# Patient Record
Sex: FEMALE | Race: WHITE | URBAN - METROPOLITAN AREA
[De-identification: names, ages, dates, MRNs, and addresses within clinical notes are randomized per-mention and may not be internally consistent; named-entity substitution may affect disease eponyms.]

---

## 2017-09-28 ENCOUNTER — HOSPITAL ENCOUNTER (OUTPATIENT)
Dept: LAB | Age: 67
Discharge: HOME | End: 2017-09-28

## 2017-09-28 ENCOUNTER — OFFICE VISIT (OUTPATIENT)
Dept: DERMATOLOGY | Facility: AMBULATORY SURGERY CENTER | Age: 67
End: 2017-09-28

## 2017-09-28 VITALS
OXYGEN SATURATION: 94 % | TEMPERATURE: 98.3 F | BODY MASS INDEX: 32.78 KG/M2 | WEIGHT: 185 LBS | HEIGHT: 63 IN | DIASTOLIC BLOOD PRESSURE: 90 MMHG | SYSTOLIC BLOOD PRESSURE: 140 MMHG | HEART RATE: 63 BPM | RESPIRATION RATE: 18 BRPM

## 2017-09-28 DIAGNOSIS — L57.0 ACTINIC KERATOSIS: ICD-10-CM

## 2017-09-28 DIAGNOSIS — L82.1 SEBORRHEIC KERATOSES: ICD-10-CM

## 2017-09-28 DIAGNOSIS — D48.5 NEOPLASM OF UNCERTAIN BEHAVIOR OF SKIN OF NECK: Primary | ICD-10-CM

## 2017-09-28 DIAGNOSIS — L81.4 LENTIGINES: ICD-10-CM

## 2017-09-28 RX ORDER — PRAVASTATIN SODIUM 80 MG/1
TABLET ORAL
Refills: 3 | COMMUNITY
Start: 2017-07-19

## 2017-09-28 RX ORDER — LOSARTAN POTASSIUM 50 MG/1
TABLET ORAL
Refills: 10 | COMMUNITY
Start: 2017-09-17

## 2017-09-28 RX ORDER — SELENIUM SULFIDE 2.5 MG/100ML
LOTION TOPICAL
COMMUNITY

## 2017-09-28 RX ORDER — FAMOTIDINE 20 MG/1
TABLET, FILM COATED ORAL
Refills: 10 | COMMUNITY
Start: 2017-08-26

## 2017-09-28 RX ORDER — MELATONIN: COMMUNITY

## 2017-09-28 RX ORDER — HYDROCHLOROTHIAZIDE 50 MG/1
TABLET ORAL
COMMUNITY

## 2017-09-28 RX ORDER — PRAVASTATIN SODIUM 20 MG/1
TABLET ORAL
COMMUNITY
End: 2017-09-28 | Stop reason: SDUPTHER

## 2017-09-28 NOTE — PROGRESS NOTES
Chief Complaint   Patient presents with    Skin Exam     1. Have you been to the ER, urgent care clinic since your last visit? Hospitalized since your last visit? No    2. Have you seen or consulted any other health care providers outside of the [address] [place] [address] since your last visit? Include any pap smears or colon screening. Yes, [facility].      Pt has some new spots on her neck, upper back and bilateral shoulders that she is concerned about.

## 2017-09-28 NOTE — MR AVS SNAPSHOT
Visit Information Date & Time Provider Department Dept. Phone Encounter #  
 9/28/2017 11:30 AM ADELA Musedexterkaylen Daja57 [phone] [id number] Your Appointments 9/28/2017 11:30 AM  
Office Visit with ADELA Muse Daja57 [address] [facility]) Appt Note: est pt: 1 yr skin exam; est pt: 1 yr skin exam  
 [facility] Copier [address] [facility] [address]  
[address] [place] [address] [address] [facility] [address] Upcoming Health Maintenance Date Due Hepatitis C Screening 1950 DTaP/Tdap/Td series (1 - Tdap) 5/13/1971 BREAST CANCER SCRN MAMMOGRAM 5/13/2000 FOBT Q 1 YEAR AGE 50-75 5/13/2000 ZOSTER VACCINE AGE 60> 3/13/2010 GLAUCOMA SCREENING Q2Y 5/13/2015 OSTEOPOROSIS SCREENING (DEXA) 5/13/2015 Pneumococcal 65+ Low/Medium Risk (1 of 2 - PCV13) 5/13/2015 MEDICARE YEARLY EXAM 5/13/2015 INFLUENZA AGE 9 TO ADULT 8/1/2017 Allergies as of 9/28/2017  Review Complete On: 9/28/2017 By: Ross Ann Severity Noted Reaction Type Reactions Cephalexin  05/30/2012    Swelling Lisinopril  01/08/2015    Swelling Current Immunizations  Never Reviewed No immunizations on file. Not reviewed this visit Vitals BP Pulse Temp Resp Height(growth percentile) Weight(growth percentile) 140/90 (BP 1 Location: Left arm, BP Patient Position: Sitting) 63 98.3 °F (36.8 °C) (Oral) 18 5' 3\" (1.6 m) 185 lb (83.9 kg) SpO2 BMI OB Status Smoking Status 94% 32.77 kg/m2 Menopause Former Smoker Vitals History BMI and BSA Data Body Mass Index Body Surface Area 32.77 kg/m 2 1.93 m 2 Preferred Pharmacy Pharmacy Name Phone [facility] [address] [place] [address]. Zara Mcburney [phone] Your Updated Medication List  
  
   
 This list is accurate as of: 9/28/17 11:29 AM.  Always use your most recent med list.  
  
  
  
  
 ESTROVEN MAXIMUM STRENGTH 400 mcg Tab Generic drug:  BenjaminCa,Min-FA-Herbal No.157 Take  by mouth. famotidine 20 mg tablet Commonly known as:  PEPCID  
TAKE 1 TABLET TWICE A DAY  
  
 hydroCHLOROthiazide 50 mg tablet Commonly known as:  HYDRODIURIL Take 50 mg by mouth daily. losartan 50 mg tablet Commonly known as:  COZAAR  
TAKE 1 TABLET EVERY DAY NexIUM 20 mg capsule Generic drug:  esomeprazole Take  by mouth daily. * pravastatin 20 mg tablet Commonly known as:  PRAVACHOL Take 20 mg by mouth nightly. * pravastatin 80 mg tablet Commonly known as:  PRAVACHOL  
TAKE 1 TABLET BY MOUTH EVERY DAY  
  
 selenium sulfide 2.5 % lotion Apply  topically daily as needed. triamterene-hydroCHLOROthiazide 37.5-25 mg per capsule Commonly known as:  Navdeep Miguelito Take  by mouth daily. VITAMIN D3 1,000 unit tablet Generic drug:  cholecalciferol Take  by mouth daily. * Notice: This list has 2 medication(s) that are the same as other medications prescribed for you. Read the directions carefully, and ask your doctor or other care provider to review them with you. Patient Instructions Self Skin Exam and Sunscreens Early detection and treatment is essential in the treatment of all forms of skin cancer. If caught early, all forms of skin cancer are curable. In addition to your regular visits, you should perform a monthly skin examination. Over time, you become familiar with what is normally found on your skin and can identify new or suspicious spots. One of the screening tools you can use to assess your skin is to follow the ABCDEs: 
 
A= Asymmetry (One half is unlike the other half) B= Border (An irregular, scalloped or poorly defined edge) C= Color (Is varied from one area to another, has shades of tan, brown/ black,       white, red or blue) D= Diameter (Spots larger than 6mm or a pencil eraser) E= Evolving (New spots or one that is changing in size, shape, or color) A follow- up interval will be customized based on your history of skin cancer or level of skin damage and risk factors. In any case, if you notice a suspicious or new spot, an appointment should be arranged between regular visits. Everyone should use sunscreen and sun-safe practices, which is especially important for those with a personal or family history of skin cancer. Suggestions for this include: 1. Use daily moisturizers containing SPF 30 or higher. 2. Wear long sleeve clothing with UPF ratings and a broad-brimmed hat. 3. Apply sunscreen with SPF 30 or higher to all sun exposed areas if you are going to be in the sun. A broad spectrum UVA/ UVB sunscreen is best.  Dont forget to REAPPLY every two hours or more often if swimming or sweating! 4. Avoid outside activities during peak sun hours, especially in the summer (10am- 2pm). 5. DO NOT use tanning beds. Using sunscreen and sun-safe practices can help reduce the likelihood of developing skin cancer or additional skin cancers in those previously diagnosed. Introducing [facility]! Adrienne Sanchez introduces [facility] patient portal. Now you can access parts of your medical record, email your doctor's office, and request medication refills online. 1. In your internet browser, go to [email or web address] 2. Click on the First Time User? Click Here link in the Sign In box. You will see the New Member Sign Up page. 3. Enter your [facility] Access Code exactly as it appears below. You will not need to use this code after youve completed the sign-up process. If you do not sign up before the expiration date, you must request a new code. · [facility] Access Code: [email or web address] Expires: 12/27/2017 11:29 AM 
 
 4. Enter the last four digits of your Social Security Number (xxxx) and Date of Birth (mm/dd/yyyy) as indicated and click Submit. You will be taken to the next sign-up page. 5. Create a SCL ID. This will be your SCL login ID and cannot be changed, so think of one that is secure and easy to remember. 6. Create a SCL password. You can change your password at any time. 7. Enter your Password Reset Question and Answer. This can be used at a later time if you forget your password. 8. Enter your e-mail address. You will receive e-mail notification when new information is available in [address] 9. Click Sign Up. You can now view and download portions of your medical record. 10. Click the Download Summary menu link to download a portable copy of your medical information. If you have questions, please visit the Frequently Asked Questions section of the SCL website. Remember, SCL is NOT to be used for urgent needs. For medical emergencies, dial 911. Now available from your iPhone and Android! Please provide this summary of care documentation to your next provider. If you have any questions after today's visit, please call [phone].

## 2017-09-28 NOTE — PROGRESS NOTES
Name: Willy Mensah       Age: 79 y.o. Date: 9/28/2017    Chief Complaint:   Chief Complaint   Patient presents with    Skin Exam       Subjective:    HPI  Ms. Willy Mensah is a 79 y.o. female who presents for a skin exam.  The patient's last skin exam was on 9/26/16 and the patient does have current complaints related to her skin. She reports a growth on the right neck that is raised and bothersome. She also reports a lesion on the left side of the nose that is persistently scaly. The patient's pertinent skin history includes : AK, no history of skin cancer    ROS: Constitutional: Negative. Dermatological : positive for - skin lesion changes      Social History     Social History    Marital status:      Spouse name: N/A    Number of children: N/A    Years of education: N/A     Occupational History    Not on file. Social History Main Topics    Smoking status: Former Smoker     Types: Cigarettes     Quit date: 5/30/1983    Smokeless tobacco: Not on file    Alcohol use Yes    Drug use: Not on file    Sexual activity: Not on file     Other Topics Concern    Not on file     Social History Narrative       No family history on file. Past Medical History:   Diagnosis Date    Essential hypertension     History of actinic keratoses     Sun-damaged skin     Sunburn, blistering        No past surgical history on file. Current Outpatient Prescriptions   Medication Sig Dispense Refill    cholecalciferol (VITAMIN D3) 1,000 unit tablet Take  by mouth daily.  losartan (COZAAR) 50 mg tablet TAKE 1 TABLET EVERY DAY  10    Mv,Ca,Min-FA-Herbal No.157 (ESTROVEN MAXIMUM STRENGTH) 400 mcg tab Take  by mouth.  hydroCHLOROthiazide (HYDRODIURIL) 50 mg tablet Take 50 mg by mouth daily.  selenium sulfide 2.5 % lotion Apply  topically daily as needed.       famotidine (PEPCID) 20 mg tablet TAKE 1 TABLET TWICE A DAY  10    pravastatin (PRAVACHOL) 80 mg tablet TAKE 1 TABLET BY MOUTH EVERY DAY  3    esomeprazole (NEXIUM) 20 mg capsule Take  by mouth daily.  triamterene-hydrochlorothiazide (DYAZIDE) 37.5-25 mg per capsule Take  by mouth daily.  pravastatin (PRAVACHOL) 20 mg tablet Take 20 mg by mouth nightly. Allergies   Allergen Reactions    Cephalexin Swelling    Lisinopril Swelling         Objective:    Visit Vitals    /90 (BP 1 Location: Left arm, BP Patient Position: Sitting)    Pulse 63    Temp 98.3 °F (36.8 °C) (Oral)    Resp 18    Ht 5' 3\" (1.6 m)    Wt 83.9 kg (185 lb)    SpO2 94%    BMI 32.77 kg/m2       Doron Romero is a 79 y.o. female who appears well and in no distress. She is awake, alert, and oriented. There is no preauricular, submandibular, or cervical lymphadenopathy. A skin examination was performed including her scalp, face (including eyelids), ears, neck, chest, back, upper extremities. She has lentigines on sun exposed areas. There are scattered seborrheic keratoses - including the lesion of concern on the right neck with inflammation, 7 x 5 mm. There is a thin scaled AK on the left nasal sidewall, right nasal tip and nasal bridge. Assessment/Plan:  1. Solar lentigos. The diagnosis and relationship to sun exposure was reviewed. Sun protection advised. 2. Seborrheic keratoses. The diagnosis was reviewed and the patient was reassured that no treatment is needed for these benign lesions. 3. Actinic Keratoses. The diagnosis of this precancerous lesion related to sun exposure was reviewed. The patient will use 5-Fu for these. 4. Neoplasm of Uncertain Behavior, right neck. The differential diagnoses were discussed. A shave removal was advised to address this lesion. The procedure was reviewed and verbal and written consent were obtained. The risks of pain, bleeding, infection, recurrence and scar were discussed. I performed the anesthesia and Dr. Jelly Long performed the procedure.   The site was cleansed and anesthetized with 1% Lidocaine with Epinephrine 1:100,000. A shave removal was performed to remove the lesion in its clinical entirety. Drysol was used for hemostasis. The wound was bandaged and care reviewed. The specimen was sent to pathology. I will contact the patient with the results and any further treatment that may be necessary. [facility]   OFFICE PROCEDURE PROGRESS NOTE   Chart reviewed for the following:   Jeffrey YBARRA, have reviewed the History, Physical and updated the Allergic reactions for Lita B Luna. TIME OUT performed immediately prior to start of procedure:   Antonieta YBARRA, have performed the following reviews on Lita B Luna   prior to the start of the procedure:     * Patient was identified by name and date of birth   * Agreement on procedure being performed was verified   * Risks and Benefits explained to the patient   * Procedure site verified and marked as necessary   * Patient was positioned for comfort   * Consent was signed and verified     Time: 1200  Date of procedure: 9/28/2017  Procedure performed by: Kim Mustafa.  Lupe Syed  Provider assisted by: lpn   Patient assisted by: self   How tolerated by patient: tolerated the procedure well with no complications   Comments: none

## 2018-03-29 ENCOUNTER — OFFICE VISIT (OUTPATIENT)
Dept: DERMATOLOGY | Facility: AMBULATORY SURGERY CENTER | Age: 68
End: 2018-03-29

## 2018-03-29 ENCOUNTER — HOSPITAL ENCOUNTER (OUTPATIENT)
Dept: LAB | Age: 68
Discharge: HOME | End: 2018-03-29

## 2018-03-29 VITALS
WEIGHT: 185 LBS | OXYGEN SATURATION: 95 % | DIASTOLIC BLOOD PRESSURE: 82 MMHG | RESPIRATION RATE: 16 BRPM | BODY MASS INDEX: 32.78 KG/M2 | SYSTOLIC BLOOD PRESSURE: 148 MMHG | HEART RATE: 56 BPM | HEIGHT: 63 IN | TEMPERATURE: 98 F

## 2018-03-29 DIAGNOSIS — D18.01 CHERRY ANGIOMA: ICD-10-CM

## 2018-03-29 DIAGNOSIS — Z12.83 SCREENING FOR MALIGNANT NEOPLASM OF SKIN: ICD-10-CM

## 2018-03-29 DIAGNOSIS — D22.9 MULTIPLE BENIGN NEVI: ICD-10-CM

## 2018-03-29 DIAGNOSIS — L81.4 LENTIGINES: ICD-10-CM

## 2018-03-29 DIAGNOSIS — L57.0 ACTINIC KERATOSIS: ICD-10-CM

## 2018-03-29 DIAGNOSIS — D48.5 NEOPLASM OF UNCERTAIN BEHAVIOR OF SKIN OF BACK: Primary | ICD-10-CM

## 2018-03-29 DIAGNOSIS — L82.1 SEBORRHEIC KERATOSES: ICD-10-CM

## 2018-03-29 NOTE — MR AVS SNAPSHOT
[address] [facility] [address] [place] [address] 
[phone] Patient: Andrew To MRN: [id number] Visit Information Date & Time Provider Department Dept. Phone Encounter #  
 3/29/2018 11:00 AM ADELA Rossi57 [phone] Your Appointments 3/29/2018 11:00 AM  
Office Visit with ADELA Rossi57 [facility] Appt Note: 6 month skin check [facility] [address] [place] [address]  
[address] [facility] [address] [place] [id number] Upcoming Health Maintenance Date Due Hepatitis C Screening 1950 DTaP/Tdap/Td series (1 - Tdap) 5/13/1971 BREAST CANCER SCRN MAMMOGRAM 5/13/2000 FOBT Q 1 YEAR AGE 50-75 5/13/2000 ZOSTER VACCINE AGE 60> 3/13/2010 GLAUCOMA SCREENING Q2Y 5/13/2015 Bone Densitometry (Dexa) Screening 5/13/2015 Pneumococcal 65+ Low/Medium Risk (1 of 2 - PCV13) 5/13/2015 Influenza Age 5 to Adult 8/1/2017 MEDICARE YEARLY EXAM 3/14/2018 Allergies as of 3/29/2018  Review Complete On: 3/29/2018 By: Sendy Medeiros LPN Severity Noted Reaction Type Reactions Cephalexin  05/30/2012    Swelling Lisinopril  01/08/2015    Swelling Current Immunizations  Never Reviewed No immunizations on file. Not reviewed this visit Vitals BP Pulse Temp Resp Height(growth percentile) Weight(growth percentile) 148/82 (BP 1 Location: Left arm, BP Patient Position: Sitting) (!) 56 98 °F (36.7 °C) (Oral) 16 5' 3\" (1.6 m) 185 lb (83.9 kg) SpO2 BMI OB Status Smoking Status 95% 32.77 kg/m2 Menopause Former Smoker BMI and BSA Data Body Mass Index Body Surface Area 32.77 kg/m 2 1.93 m 2 Preferred Pharmacy Pharmacy Name Phone [facility] [address] [facility] [address] Baylee Li [phone] Your Updated Medication List  
  
   
This list is accurate as of 3/29/18 10:36 AM.  Always use your most recent med list.  
  
  
  
  
 ESTROVEN MAXIMUM STRENGTH 400 mcg Tab Generic drug:  Mv,Ca,Min-FA-Herbal No.157 Take  by mouth. famotidine 20 mg tablet Commonly known as:  PEPCID  
TAKE 1 TABLET TWICE A DAY  
  
 hydroCHLOROthiazide 50 mg tablet Commonly known as:  HYDRODIURIL Take 50 mg by mouth daily. losartan 50 mg tablet Commonly known as:  COZAAR  
TAKE 1 TABLET EVERY DAY NexIUM 20 mg capsule Generic drug:  esomeprazole Take  by mouth daily. * pravastatin 20 mg tablet Commonly known as:  PRAVACHOL Take 20 mg by mouth nightly. * pravastatin 80 mg tablet Commonly known as:  PRAVACHOL  
TAKE 1 TABLET BY MOUTH EVERY DAY  
  
 selenium sulfide 2.5 % lotion Apply  topically daily as needed. triamterene-hydroCHLOROthiazide 37.5-25 mg per capsule Commonly known as:  Lenton Bumps Take  by mouth daily. VITAMIN D3 1,000 unit tablet Generic drug:  cholecalciferol Take  by mouth daily. * Notice: This list has 2 medication(s) that are the same as other medications prescribed for you. Read the directions carefully, and ask your doctor or other care provider to review them with you. Patient Instructions Self Skin Exam and Sunscreens Early detection and treatment is essential in the treatment of all forms of skin cancer. If caught early, all forms of skin cancer are curable. In addition to your regular visits, you should perform a monthly skin examination. Over time, you become familiar with what is normally found on your skin and can identify new or suspicious spots. One of the screening tools you can use to assess your skin is to follow the ABCDEs: 
 
A= Asymmetry (One half is unlike the other half) B= Border (An irregular, scalloped or poorly defined edge) C= Color (Is varied from one area to another, has shades of tan, brown/ black,       white, red or blue) D= Diameter (Spots larger than 6mm or a pencil eraser) E= Evolving (New spots or one that is changing in size, shape, or color) A follow- up interval will be customized based on your history of skin cancer or level of skin damage and risk factors. In any case, if you notice a suspicious or new spot, an appointment should be arranged between regular visits. Everyone should use sunscreen and sun-safe practices, which is especially important for those with a personal or family history of skin cancer. Suggestions for this include: 1. Use daily moisturizers containing SPF 30 or higher. 2. Wear long sleeve clothing with UPF ratings and a broad-brimmed hat. 3. Apply sunscreen with SPF 30 or higher to all sun exposed areas if you are going to be in the sun. A broad spectrum UVA/ UVB sunscreen is best.  Dont forget to REAPPLY every two hours or more often if swimming or sweating! 4. Avoid outside activities during peak sun hours, especially in the summer (10am- 2pm). 5. DO NOT use tanning beds. Using sunscreen and sun-safe practices can help reduce the likelihood of developing skin cancer or additional skin cancers in those previously diagnosed. Introducing [facility]! [employer] introduces [employer] patient portal. Now you can access parts of your medical record, email your doctor's office, and request medication refills online. 1. In your internet browser, go to [email or web address] 2. Click on the First Time User? Click Here link in the Sign In box. You will see the New Member Sign Up page. 3. Enter your [facility] Access Code exactly as it appears below. You will not need to use this code after youve completed the sign-up process.  If you do not sign up before the expiration date, you must request a new code. · [employer] Access Code: [email or web address] Expires: 6/27/2018 10:35 AM 
 
4. Enter the last four digits of your Social Security Number (xxxx) and Date of Birth (mm/dd/yyyy) as indicated and click Submit. You will be taken to the next sign-up page. 5. Create a [employer] ID. This will be your [employer] login ID and cannot be changed, so think of one that is secure and easy to remember. 6. Create a [employer] password. You can change your password at any time. 7. Enter your Password Reset Question and Answer. This can be used at a later time if you forget your password. 8. Enter your e-mail address. You will receive e-mail notification when new information is available in [address] 9. Click Sign Up. You can now view and download portions of your medical record. 10. Click the Download Summary menu link to download a portable copy of your medical information. If you have questions, please visit the Frequently Asked Questions section of the [employer] website. Remember, [employer] is NOT to be used for urgent needs. For medical emergencies, dial 911. Now available from your iPhone and Android! Please provide this summary of care documentation to your next provider. If you have any questions after today's visit, please call [phone].

## 2018-03-29 NOTE — PROGRESS NOTES
Name: Flory Goodman       Age: 79 y.o. Date: 3/29/2018    Chief Complaint:   Chief Complaint   Patient presents with    Skin Exam       Subjective:    HPI  Ms. Flory Goodman is a 79 y.o. female who presents for a full skin exam.  The patient's last skin exam was on 9/28/17 and the patient does have current complaints related to her skin. She notes scaly lesions on the nose and one that resolved on the left medial cheek. No associated symptoms other than scaling. No prior treatment. The patient's pertinent skin history includes : AK, has used 5-Fu in the past    ROS: Constitutional: Negative. Dermatological : positive for - skin lesion changes      Social History     Social History    Marital status:      Spouse name: N/A    Number of children: N/A    Years of education: N/A     Occupational History    Not on file. Social History Main Topics    Smoking status: Former Smoker     Types: Cigarettes     Quit date: 5/30/1983    Smokeless tobacco: Never Used    Alcohol use Yes    Drug use: Not on file    Sexual activity: Not on file     Other Topics Concern    Not on file     Social History Narrative       History reviewed. No pertinent family history. Past Medical History:   Diagnosis Date    Essential hypertension     History of actinic keratoses     Sun-damaged skin     Sunburn, blistering        History reviewed. No pertinent surgical history. Current Outpatient Prescriptions   Medication Sig Dispense Refill    cholecalciferol (VITAMIN D3) 1,000 unit tablet Take  by mouth daily.  hydroCHLOROthiazide (HYDRODIURIL) 50 mg tablet Take 50 mg by mouth daily.  selenium sulfide 2.5 % lotion Apply  topically daily as needed.  losartan (COZAAR) 50 mg tablet TAKE 1 TABLET EVERY DAY  10    pravastatin (PRAVACHOL) 80 mg tablet TAKE 1 TABLET BY MOUTH EVERY DAY  3    Mv,Ca,Min-FA-Herbal No.157 (ESTROVEN MAXIMUM STRENGTH) 400 mcg tab Take  by mouth.       triamterene-hydrochlorothiazide (DYAZIDE) 37.5-25 mg per capsule Take  by mouth daily.  pravastatin (PRAVACHOL) 20 mg tablet Take 20 mg by mouth nightly.  famotidine (PEPCID) 20 mg tablet TAKE 1 TABLET TWICE A DAY  10    esomeprazole (NEXIUM) 20 mg capsule Take  by mouth daily. Allergies   Allergen Reactions    Cephalexin Swelling    Lisinopril Swelling         Objective:    Visit Vitals    /82 (BP 1 Location: Left arm, BP Patient Position: Sitting)    Pulse (!) 56    Temp 98 °F (36.7 °C) (Oral)    Resp 16    Ht 5' 3\" (1.6 m)    Wt 83.9 kg (185 lb)    SpO2 95%    BMI 32.77 kg/m2       Breanne To is a 79 y.o. female who appears well and in no distress. She is awake, alert, and oriented. There is no preauricular, submandibular, or cervical lymphadenopathy. A skin examination was performed including her scalp, face (including eyelids), ears, neck, chest, back, abdomen, upper extremities (including digits/nails), lower extremities, breasts, buttocks; genital skin was not examined. She is to thin scaled actinic keratosis noted on the nasal dorsum. There are lentigines on sun exposed areas with her fair skin. She is scattered stuck on waxy macules and keratotic papules consistent with seborrheic keratoses. There are intradermal pink nevi, one that is more pink than the others on the left mid back, 6 x 5 mm. This does have a tan dot of pigment at the inferior aspect  Visible under  dermatoscopy. She has numerous red macules and papules consistent with  cherry angiomas. No lesions concerning for non melanoma skin cancer are noted on today's exam however the nevus that is more pink than the others that look in the left mid back will be sampled to rule out atypical cells. Assessment/Plan:  1. Neoplasm of Uncertain Behavior, left back. The differential diagnoses were discussed. A shave biopsy was advised to sample this lesion.   The procedure was reviewed and verbal and written consent were obtained. The risks of pain, bleeding, infection, and scar were discussed. The patient is aware that this is a sample and is intended for diagnosis and not therapy of the skin lesion. I performed the procedure. The site was cleansed and anesthetized with 1% Lidocaine with Epinephrine 1:100,000. A shave biopsy was performed to sample the lesion. Drysol was used for hemostasis. The wound was bandaged and care reviewed. The specimen was sent to pathology. I will contact the patient with the results and any further treatment that may be necessary. [facility]   OFFICE PROCEDURE PROGRESS NOTE   Chart reviewed for the following:   Jeffrey YBARRA, have reviewed the History, Physical and updated the Allergic reactions for Lita B Luna. TIME OUT performed immediately prior to start of procedure:   Antonieta YBARRA, have performed the following reviews on Lita B Luna   prior to the start of the procedure:     * Patient was identified by name and date of birth   * Agreement on procedure being performed was verified   * Risks and Benefits explained to the patient   * Procedure site verified and marked as necessary   * Patient was positioned for comfort   * Consent was signed and verified     Time: 6646  Date of procedure: 3/29/2018  Procedure performed by: Ana Maria Minaya. Sophia Roblero  Provider assisted by: lpn   Patient assisted by: self   How tolerated by patient: tolerated the procedure well with no complications   Comments: none  2. Normal nevi. The diagnosis of normal nevi was reviewed. I discussed sun protection, sunscreen use, the warning signs of skin cancer, the need for self-skin examinations, and the need for regular practitioner exams every 6 months. The patient should follow up sooner as needed if new, changing, or symptomatic skin lesions arise. 3.Seborrheic keratoses.   The diagnosis was reviewed and the patient was reassured that no treatment is needed for these benign lesions. 4.Cherry angiomas. The diagnosis was reviewed and the patient was reassured that no treatment is needed for these benign lesions. 5.Actinic Keratoses. The diagnosis of this precancerous lesion related to sun exposure was reviewed. Verbal consent was obtained. I treated 2 lesions with cryotherapy and post-cryotherapy care was reviewed. 6.Solar lentigos. The diagnosis and relationship to sun exposure was reviewed. Sun protection advised.

## 2018-03-29 NOTE — PROGRESS NOTES
Mrs. Jamal Sam comes in for follow-up. She has a history of actinic keratoses. There is a new lesion on her back, asymptomatic and not noticed by her. She is feeling well and in her usual state of health today. She has no pain, no current illnesses, no other skin concerns. Her allergies medications medical and social history are reviewed by me today. I have reviewed the history, physical exam, assessment and plan by Anna Lester NP and agree. She is awake alert lady who appears well. I examined her back. She has scattered seborrheic keratoses. The lesion that stands out is a pink raised macule that is symmetric on the left back. Neoplasm, possibly irritated intradermal nevus, left back. Diagnosis was discussed. Due to its appearance and to rule out atypia, shave removal was performed today. Follow-up as needed based on the report.

## 2018-08-21 ENCOUNTER — OFFICE VISIT (OUTPATIENT)
Dept: DERMATOLOGY | Facility: AMBULATORY SURGERY CENTER | Age: 68
End: 2018-08-21

## 2018-08-21 VITALS
OXYGEN SATURATION: 97 % | SYSTOLIC BLOOD PRESSURE: 114 MMHG | HEIGHT: 63 IN | DIASTOLIC BLOOD PRESSURE: 80 MMHG | WEIGHT: 185 LBS | BODY MASS INDEX: 32.78 KG/M2 | TEMPERATURE: 98 F | RESPIRATION RATE: 14 BRPM | HEART RATE: 72 BPM

## 2018-08-21 DIAGNOSIS — L57.0 ACTINIC KERATOSIS: Primary | ICD-10-CM

## 2018-08-21 DIAGNOSIS — L82.0 INFLAMED SEBORRHEIC KERATOSIS: ICD-10-CM

## 2018-08-21 DIAGNOSIS — D18.01 CHERRY ANGIOMA: ICD-10-CM

## 2018-08-21 DIAGNOSIS — L81.4 LENTIGINES: ICD-10-CM

## 2018-08-21 DIAGNOSIS — L82.1 OTHER SEBORRHEIC KERATOSIS: ICD-10-CM

## 2018-08-21 DIAGNOSIS — L30.9 DERMATITIS: ICD-10-CM

## 2018-08-21 DIAGNOSIS — D22.9 MULTIPLE BENIGN NEVI: ICD-10-CM

## 2018-08-21 NOTE — PROGRESS NOTES
This note was written by Byron León, as dictated by Nicole Smith MD.     CC: Full skin exam, history of actinic keratoses    HPI:  Ms. Fede Joy is a 76 y.o. female who presents for a full skin exam.  The patient's last skin exam was on 03/29/18 and the patient does have current complaints related to her skin. She reports pink itchy and flaky patches on her back and scalp. She also reports a new itchy lesion on her left shoulder. She reports a new bothersome and raised lesion on her left temple. She has a history of actinic keratoses to which she has previously treated with Fluorouracil in the past. She is feeling well and in her usual state of health today. She has no pain, no current illnesses, no other skin concerns. Her allergies, medications, medical, and social history are reviewed by me today. Exam:  Fede Joy is a 76 y.o. female who appears well and in no distress. She is awake, alert, and oriented. There is no preauricular, submandibular, or cervical lymphadenopathy. A skin examination was performed including her scalp, face (including eyelids), ears, neck, chest, back, abdomen, upper extremities (including digits/nails), breasts. She has scattered waxy macules and keratotic papules consistent with seborrheic keratoses, including the lesion of concern on her left shoulder. She has an irritated seborrheic keratosis on her left temple. She has dry skin on her back and scalp. She has a thin-scaled actinic keratosis on her left cheek. She has lentigines on sun exposed areas. She has pink intradermal nevi and brown junctional nevi, no concerning features, and lentigines. Assessment/Plan:    1. Seborrheic keratoses. The diagnosis was reviewed and the patient was reassured that no treatment is needed for these benign lesions. 2. Inflamed seborrheic keratoses. The diagnosis was discussed. Verbal consent was obtained.   I treated 1 lesion with cryotherapy and post-cryotherapy care was reviewed. 3. Dermatitis. I recommended the use of Head & Shoulders shampoo for the area on her scalp. I also recommended heavy moisturizer for the lesions on her back. 4. Actinic Keratoses. The diagnosis of this precancerous lesion related to sun exposure was reviewed. Verbal consent was obtained. I treated 1 lesion with cryotherapy and post-cryotherapy care was reviewed. 5. Solar lentigos. The diagnosis and relationship to sun exposure was reviewed. Sun protection advised. 6. Normal nevi and lentigines. The diagnoses of normal nevi were reviewed. I discussed sun protection, sunscreen use, the warning signs of skin cancer, the need for self-skin examinations, and the need for regular practitioner exams every 1 year. The patient should follow up sooner as needed if new, changing, or symptomatic skin lesions arise. This scribe documentation was reviewed by me and accurately reflects the examination and decisions made by me. [facility]   OFFICE PROCEDURE PROGRESS NOTE     Chart reviewed for the following:     Esperanza Jamil MD, have reviewed the History, Physical and updated the Allergic reactions for Lita B Luna    TIME OUT performed immediately prior to start of procedure:     Esperanza Stevenson. Fortino Jamil MD, have performed the following reviews on Lita B Luna prior to the start of the procedure:     * Patient was identified by name and date of birth   * Agreement on procedure being performed was verified   * Risks and Benefits explained to the patient   * Procedure site verified and marked as necessary   * Patient was positioned for comfort   * Consent was signed and verified     Time: 10:40 AM  Date of procedure: 8/21/2018  Procedure performed by: Tara Ortiz.  Fortino Jamil MD   Provider assisted by: self  Patient assisted by: self   How tolerated by patient: tolerated the procedure well with no complications   Comments: none

## 2018-08-21 NOTE — MR AVS SNAPSHOT
[address] [facility] [address] [place] [address] [address] 
[phone] Patient: Carly Martinez MRN: [id number] GPT:4/61/8154 Visit Information Date & Time Provider Department Dept. Phone Encounter #  
 8/21/2018 10:30 AM MD Kaleb Israel [id number] Your Appointments 8/21/2018 10:30 AM  
SKIN EXAM with MD Kaleb Israel [address] [place] [address]) Appt Note: est.pt annual skin exam; est.pt annual skin exam  
 [facility] [address]  
[address] [facility] [address] [place] [id number] Upcoming Health Maintenance Date Due Hepatitis C Screening 1950 DTaP/Tdap/Td series (1 - Tdap) 5/13/1971 BREAST CANCER SCRN MAMMOGRAM 5/13/2000 FOBT Q 1 YEAR AGE 50-75 5/13/2000 ZOSTER VACCINE AGE 60> 3/13/2010 GLAUCOMA SCREENING Q2Y 5/13/2015 Bone Densitometry (Dexa) Screening 5/13/2015 Pneumococcal 65+ Low/Medium Risk (1 of 2 - PCV13) 5/13/2015 MEDICARE YEARLY EXAM 3/14/2018 Influenza Age 5 to Adult 8/1/2018 Allergies as of 8/21/2018  Review Complete On: 8/21/2018 By: Fredy Lima Severity Noted Reaction Type Reactions Cephalexin  05/30/2012    Swelling Lisinopril  01/08/2015    Swelling Current Immunizations  Never Reviewed No immunizations on file. Not reviewed this visit Vitals BP Pulse Temp Resp Height(growth percentile) Weight(growth percentile) 114/80 (BP 1 Location: Left arm, BP Patient Position: Sitting) 72 98 °F (36.7 °C) (Oral) 14 5' 3\" (1.6 m) 185 lb (83.9 kg) SpO2 BMI OB Status Smoking Status 97% 32.77 kg/m2 Menopause Former Smoker Vitals History BMI and BSA Data Body Mass Index Body Surface Area 32.77 kg/m 2 1.93 m 2 Preferred Pharmacy Pharmacy Name Phone [facility] [address] [facility] [address] Liz Tovar [phone] Your Updated Medication List  
  
   
This list is accurate as of 8/21/18  9:58 AM.  Always use your most recent med list.  
  
  
  
  
 ESTROVEN MAXIMUM STRENGTH 400 mcg Tab Generic drug:  Mv,Ca,Min-FA-Herbal No.157 Take  by mouth. famotidine 20 mg tablet Commonly known as:  PEPCID  
TAKE 1 TABLET TWICE A DAY  
  
 hydroCHLOROthiazide 50 mg tablet Commonly known as:  HYDRODIURIL Take 50 mg by mouth daily. losartan 50 mg tablet Commonly known as:  COZAAR  
TAKE 1 TABLET EVERY DAY NexIUM 20 mg capsule Generic drug:  esomeprazole Take  by mouth daily. * pravastatin 20 mg tablet Commonly known as:  PRAVACHOL Take 20 mg by mouth nightly. * pravastatin 80 mg tablet Commonly known as:  PRAVACHOL  
TAKE 1 TABLET BY MOUTH EVERY DAY  
  
 selenium sulfide 2.5 % lotion Apply  topically daily as needed. triamterene-hydroCHLOROthiazide 37.5-25 mg per capsule Commonly known as:  Spanish Danilo Take  by mouth daily. VITAMIN D3 1,000 unit tablet Generic drug:  cholecalciferol Take  by mouth daily. * Notice: This list has 2 medication(s) that are the same as other medications prescribed for you. Read the directions carefully, and ask your doctor or other care provider to review them with you. Patient Instructions Self Skin Exam and Sunscreens Early detection and treatment is essential in the treatment of all forms of skin cancer. If caught early, all forms of skin cancer are curable. In addition to your regular visits, you should perform a monthly skin examination. Over time, you become familiar with what is normally found on your skin and can identify new or suspicious spots. One of the screening tools you can use to assess your skin is to follow the ABCDEs: 
 
A= Asymmetry (One half is unlike the other half) B= Border (An irregular, scalloped or poorly defined edge) C= Color (Is varied from one area to another, has shades of tan, brown/ black,       white, red or blue) D= Diameter (Spots larger than 6mm or a pencil eraser) E= Evolving (New spots or one that is changing in size, shape, or color) A follow- up interval will be customized based on your history of skin cancer or level of skin damage and risk factors. In any case, if you notice a suspicious or new spot, an appointment should be arranged between regular visits. Everyone should use sunscreen and sun-safe practices, which is especially important for those with a personal or family history of skin cancer. Suggestions for this include: 1. Use daily moisturizers containing SPF 30 or higher. 2. Wear long sleeve clothing with UPF ratings and a broad-brimmed hat. 3. Apply sunscreen with SPF 30 or higher to all sun exposed areas if you are going to be in the sun. A broad spectrum UVA/ UVB sunscreen is best.  Dont forget to REAPPLY every two hours or more often if swimming or sweating! 4. Avoid outside activities during peak sun hours, especially in the summer (10am- 2pm). 5. DO NOT use tanning beds. Using sunscreen and sun-safe practices can help reduce the likelihood of developing skin cancer or additional skin cancers in those previously diagnosed. Introducing [facility]! [employer] introduces [employer] patient portal. Now you can access parts of your medical record, email your doctor's office, and request medication refills online. 1. In your internet browser, go to [email or web address] 2. Click on the First Time User? Click Here link in the Sign In box. You will see the New Member Sign Up page. 3. Enter your [employer]I Access Code exactly as it appears below. You will not need to use this code after youve completed the sign-up process.  If you do not sign up before the expiration date, you must request a new code. · [employer] Access Code: [email or web address] Expires: 11/19/2018  9:58 AM 
 
4. Enter the last four digits of your Social Security Number (xxxx) and Date of Birth (mm/dd/yyyy) as indicated and click Submit. You will be taken to the next sign-up page. 5. Create a CATASYS ID. This will be your [employer]ASYS login ID and cannot be changed, so think of one that is secure and easy to remember. 6. Create a CAT[employer]YS password. You can change your password at any time. 7. Enter your Password Reset Question and Answer. This can be used at a later time if you forget your password. 8. Enter your e-mail address. You will receive e-mail notification when new information is available in [address]. 9. Click Sign Up. You can now view and download portions of your medical record. 10. Click the Download Summary menu link to download a portable copy of your medical information. If you have questions, please visit the Frequently Asked Questions section of the [employer] website. Remember, [employer] is NOT to be used for urgent needs. For medical emergencies, dial 911. Now available from your iPhone and Android! Please provide this summary of care documentation to your next provider. If you have any questions after today's visit, please call [phone].
